# Patient Record
Sex: MALE | Race: OTHER | HISPANIC OR LATINO | URBAN - METROPOLITAN AREA
[De-identification: names, ages, dates, MRNs, and addresses within clinical notes are randomized per-mention and may not be internally consistent; named-entity substitution may affect disease eponyms.]

---

## 2020-03-03 ENCOUNTER — EMERGENCY (EMERGENCY)
Facility: HOSPITAL | Age: 35
LOS: 1 days | Discharge: HOME | End: 2020-03-03
Attending: EMERGENCY MEDICINE
Payer: MEDICAID

## 2020-03-03 VITALS
WEIGHT: 149.91 LBS | HEART RATE: 86 BPM | SYSTOLIC BLOOD PRESSURE: 133 MMHG | HEIGHT: 65 IN | OXYGEN SATURATION: 100 % | RESPIRATION RATE: 16 BRPM | TEMPERATURE: 98 F | DIASTOLIC BLOOD PRESSURE: 90 MMHG

## 2020-03-03 PROCEDURE — 99283 EMERGENCY DEPT VISIT LOW MDM: CPT

## 2020-03-03 PROCEDURE — T1013: CPT

## 2020-03-03 RX ORDER — IBUPROFEN 200 MG
1 TABLET ORAL
Qty: 16 | Refills: 0
Start: 2020-03-03 | End: 2020-03-06

## 2020-03-03 RX ORDER — IBUPROFEN 200 MG
600 TABLET ORAL ONCE
Refills: 0 | Status: COMPLETED | OUTPATIENT
Start: 2020-03-03 | End: 2020-03-03

## 2020-03-03 RX ADMIN — Medication 600 MILLIGRAM(S): at 15:45

## 2020-03-03 RX ADMIN — Medication 600 MILLIGRAM(S): at 15:44

## 2020-03-03 NOTE — ED PROVIDER NOTE - PATIENT PORTAL LINK FT
You can access the FollowMyHealth Patient Portal offered by [facility] by registering at the following website: [email or web address]. By joining [facility]’s FollowMyHealth portal, you will also be able to view your health information using other applications (apps) compatible with our system.

## 2020-03-03 NOTE — ED PROVIDER NOTE - ATTENDING CONTRIBUTION TO CARE
Hai: I performed a face to face bedside interview with patient regarding history of present illness, review of symptoms and past medical history. I completed an independent physical exam.  I have discussed patient's plan of care with advanced care provider.   I agree with note as stated above including HISTORY OF PRESENT ILLNESS, HIV, PAST MEDICAL/SURGICAL/FAMILY/SOCIAL HISTORY, ALLERGIES AND HOME MEDICATIONS, REVIEW OF SYSTEMS, PHYSICAL EXAM, MEDICAL DECISION MAKING and any PROGRESS NOTES during the time I functioned as the attending physician for this patient  unless otherwise noted. My brief assessment is as follows: 34M p/w R sided jaw pain. Felt a pop after yawning and then began having pain. Denies trauma. Jaw aligned properly on exam. Able to  tongue depressor against traction on exam. Plan for pain control and dc, return precautions given.

## 2020-03-03 NOTE — ED PROVIDER NOTE - CLINICAL SUMMARY MEDICAL DECISION MAKING FREE TEXT BOX
34 yr old M presented to ED with mandibular pain x 4 days. Pt denies any trauma or injury to his face. Pt says that he was yawning when he felt the pain . Pt denies any prior injury to his jaw. Pt examination + slight tenderness to TMJ area, Pt able to open his mouth with no issues. Pt treated with Motrin and D/C in stable condition.

## 2020-03-03 NOTE — ED PROVIDER NOTE - OBJECTIVE STATEMENT
34 yr old M presented to ED with mandibular pain x 4 days. Pt denies any trauma or injury to his face. Pt says that he was yawning when he felt the pain . Pt denies any prior injury to his jaw. Pt also denies pain radiating to his ear. Pt also denies any toothache. Pt says  that his tooth are non lining up. 34 yr old M presented to ED with mandibular pain x 4 days. Pt denies any trauma or injury to his face. Pt says that he was yawning when he felt the pain . Pt denies any prior injury to his jaw. Pt also denies pain radiating to his ear. Pt also denies any toothache. Pt says  that his tooth are not lining up. Pt denies any other issues at this time. 34 yr old M presented to ED with mandibular pain x 4 days. Pt denies any trauma or injury to his face. Pt says that he was yawning when he felt the pain . Pt denies any prior injury to his jaw. Pt also denies pain radiating to his ear. Pt also denies any toothache. Pt says  that his tooth are not lining up. Pt denies any other issues at this time. Pt denies any significant past medical or surgical illness.

## 2020-03-03 NOTE — ED ADULT TRIAGE NOTE - CHIEF COMPLAINT QUOTE
Pt with right sided jaw pain that occurs when he opens his mouth to eat. Denies any injury to that area. Denies any dental issues.

## 2022-11-30 ENCOUNTER — APPOINTMENT (OUTPATIENT)
Dept: CARDIOLOGY | Facility: CLINIC | Age: 37
End: 2022-11-30

## 2023-01-07 ENCOUNTER — EMERGENCY (EMERGENCY)
Facility: HOSPITAL | Age: 38
LOS: 1 days | End: 2023-01-07
Attending: EMERGENCY MEDICINE
Payer: MEDICAID

## 2023-01-07 VITALS
TEMPERATURE: 98 F | RESPIRATION RATE: 18 BRPM | HEART RATE: 91 BPM | DIASTOLIC BLOOD PRESSURE: 87 MMHG | OXYGEN SATURATION: 97 % | SYSTOLIC BLOOD PRESSURE: 160 MMHG | WEIGHT: 171.96 LBS

## 2023-01-07 VITALS
RESPIRATION RATE: 18 BRPM | TEMPERATURE: 98 F | HEART RATE: 88 BPM | OXYGEN SATURATION: 98 % | SYSTOLIC BLOOD PRESSURE: 130 MMHG

## 2023-01-07 PROCEDURE — 99284 EMERGENCY DEPT VISIT MOD MDM: CPT

## 2023-01-07 PROCEDURE — 0225U NFCT DS DNA&RNA 21 SARSCOV2: CPT

## 2023-01-07 PROCEDURE — 99283 EMERGENCY DEPT VISIT LOW MDM: CPT

## 2023-01-07 RX ORDER — GUAIFENESIN/DEXTROMETHORPHAN 600MG-30MG
10 TABLET, EXTENDED RELEASE 12 HR ORAL
Qty: 90 | Refills: 0
Start: 2023-01-07 | End: 2023-01-09

## 2023-01-07 NOTE — ED PROVIDER NOTE - PATIENT PORTAL LINK FT
You can access the FollowMyHealth Patient Portal offered by [facility] by registering at the following website: [email or web address]. By joining [facility]’s FollowMyHealth portal, you will also be able to view your health information using other applications (apps) compatible with our system.

## 2023-01-07 NOTE — ED PROVIDER NOTE - OBJECTIVE STATEMENT
38 y/o M with no reported PMHx p/w dry cough/runny nose x 1 week. Also reports of 1 episode of scant blood in his sputum after coughing few days ago which resolved. Denies fever/chills, sob, cp, rash, sore throat, ear pain, night sweats, weight loss, palpitations, leg swelling/calf tenderness, LOVE, orthopnea, abdominal pain, back pain and with no other c/o. Denies any exposure with sick contacts. Denies smoking/illicit drug use.

## 2023-01-07 NOTE — ED PROVIDER NOTE - NSFOLLOWUPINSTRUCTIONS_ED_ALL_ED_FT
Follow up with your PCP within 2-3 days  Return to the emergency room if you are experiencing any new or worsening symptoms    Cough    Coughing is a reflex that clears your throat and your airways. Coughing helps to heal and protect your lungs. It is normal to cough occasionally, but a cough that happens with other symptoms or lasts a long time may be a sign of a condition that needs treatment. Coughing may be caused by infections, asthma or COPD, smoking, postnasal drip, gastroesophageal reflux, as well as other medical conditions. Take medicines only as instructed by your health care provider. Avoid environments or triggers that causes you to cough at work or at home.    SEEK IMMEDIATE MEDICAL CARE IF YOU HAVE ANY OF THE FOLLOWING SYMPTOMS: coughing up blood, shortness of breath, rapid heart rate, chest pain, unexplained weight loss or night sweats.

## 2023-01-07 NOTE — ED ADULT TRIAGE NOTE - CHIEF COMPLAINT QUOTE
patient with 1 week with worsening cough, runny nose, occasionally sees blood streaks in mucous. no fevers.

## 2023-01-07 NOTE — ED ADULT TRIAGE NOTE - HISTORY OF COVID-19 VACCINATION
Called pt and reviewed the new jaun't with dr devlin, she vu and confirmed, df  
PT: SELF    PT CALLING TO SCHEDULE AN APPT FOR A F/U FROM BEING IN HOSPITAL AND GETTING 2 UNITS OF BLOOD.  PLEASE ADVISE    PT #: [phone]  
lvm about jaun't, asked pt to call and confirm, df  
Vaccine status unknown

## 2023-01-07 NOTE — ED ADULT NURSE NOTE - OBJECTIVE STATEMENT
Assumed care of patient in subwr-a. Pt a&ox4 rr even and unlabored with no pmhx presents to with nasal congestion and 1 episode of scant blood in sputum x1 week(resolved as per pt). Pt denies fever, chills, chest pain, abd pain, back pain, exposure to sick contacts. pt educated on plan of care, pt able to successfully teach back plan of care to RN, RN will continue to reeducate pt during hospital stay.

## 2023-01-07 NOTE — ED PROVIDER NOTE - PHYSICAL EXAMINATION
Constitutional: Awake, alert and oriented. In no acute distress. Well appearing.  HEENT: NC/AT. Moist mucous membranes.  Eyes: No scleral icterus. EOMI.  Neck:. Soft and supple. Full ROM without pain.  Cardiac: Regular rate and regular rhythm. +S1/S2. Peripheral pulses 2+ and symmetric. No LE edema.  Respiratory: Speaking in full sentences. No evidence of respiratory distress. No wheezes, rales or rhonchi.  Abdomen: Soft, non-distended and non tender Normal bowel sounds in all 4 quadrants. No guarding or rebound. no CVAT  Back: Spine midline and non-tender.   Skin: No rashes, abrasions or lacerations.  Lymph: No cervical lymphadenopathy.  Neuro: Awake, alert & oriented x 3. Moves all extremities symmetrically. Negative pronator drift, strength 5/5 all upper and lower extremities, sensation to light touch intact throughout upper/ lower extremities and face, finger to nose coordination intact, cranial nerves 2-12 intact  Psych: calm, cooperative, normal affect Constitutional: Awake, alert and oriented. In no acute distress. Well appearing.  HEENT: NC/AT. Moist mucous membranes.  Eyes: No scleral icterus. EOMI.  Neck:. Soft and supple. Full ROM without pain.  Cardiac: Regular rate and regular rhythm. +S1/S2. Peripheral pulses 2+ and symmetric. No LE edema.  Respiratory: Speaking in full sentences. No evidence of respiratory distress. No wheezes, rales or rhonchi.  Abdomen: Soft, non-distended and non tender Normal bowel sounds in all 4 quadrants. No guarding or rebound. no CVAT  Back: Spine midline and non-tender.   Skin: No rashes, abrasions or lacerations.  Lymph: No cervical lymphadenopathy.  Neuro: Awake, alert & oriented x 3. Moves all extremities symmetrically. Negative pronator drift, strength 5/5 all upper and lower extremities, sensation to light touch intact throughout upper/ lower extremities and face, finger to nose coordination intact, cranial nerves 2-12 intact  Psych: calm, cooperative, normal affect.

## 2023-01-07 NOTE — ED PROVIDER NOTE - NS ED ATTENDING STATEMENT MOD
This was a shared visit with the RODOLFO. I reviewed and verified the documentation and independently performed the documented:

## 2023-01-07 NOTE — ED PROVIDER NOTE - CLINICAL SUMMARY MEDICAL DECISION MAKING FREE TEXT BOX
38 y/o M with no reported PMHx p/w dry cough/runny nose x 1 week. Most likely viral illness. Vitals signs stable with good oxygen saturation on room air, pt looks well and lungs CTA bilaterally. Viral swab sent/pending. Instructed to f/u with PCP within 2-3 days. Strict ED return precautions given if any new or worsening symptoms.

## 2023-01-08 LAB
RAPID RVP RESULT: SIGNIFICANT CHANGE UP
SARS-COV-2 RNA SPEC QL NAA+PROBE: SIGNIFICANT CHANGE UP

## 2023-02-22 ENCOUNTER — EMERGENCY (EMERGENCY)
Facility: HOSPITAL | Age: 38
LOS: 1 days | Discharge: HOME | End: 2023-02-22
Attending: STUDENT IN AN ORGANIZED HEALTH CARE EDUCATION/TRAINING PROGRAM
Payer: MEDICAID

## 2023-02-22 VITALS
HEART RATE: 82 BPM | OXYGEN SATURATION: 95 % | WEIGHT: 315 LBS | RESPIRATION RATE: 16 BRPM | DIASTOLIC BLOOD PRESSURE: 88 MMHG | TEMPERATURE: 98 F | SYSTOLIC BLOOD PRESSURE: 138 MMHG | HEIGHT: 64 IN

## 2023-02-22 PROCEDURE — 99284 EMERGENCY DEPT VISIT MOD MDM: CPT

## 2023-02-22 RX ORDER — OFLOXACIN 0.3 %
1 DROPS OPHTHALMIC (EYE) ONCE
Refills: 0 | Status: COMPLETED | OUTPATIENT
Start: 2023-02-22 | End: 2023-02-22

## 2023-02-22 RX ORDER — OFLOXACIN 0.3 %
2 DROPS OPHTHALMIC (EYE)
Qty: 20 | Refills: 0
Start: 2023-02-22 | End: 2023-02-26

## 2023-02-22 RX ORDER — FLUORESCEIN SODIUM 9 MG
1 STRIP OPHTHALMIC (EYE) ONCE
Refills: 0 | Status: COMPLETED | OUTPATIENT
Start: 2023-02-22 | End: 2023-02-22

## 2023-02-22 RX ADMIN — Medication 1 DROP(S): at 23:47

## 2023-02-22 RX ADMIN — Medication 1 APPLICATION(S): at 23:47

## 2023-02-22 NOTE — ED PROVIDER NOTE - CLINICAL SUMMARY MEDICAL DECISION MAKING FREE TEXT BOX
36 y/o M with right eye pain x 3 days, small eyelash poking inwards from medial right upper eyelid which was removed, +corneal abrasion  will give abx eye drops and f/u ophtho   nam

## 2023-02-22 NOTE — ED PROVIDER NOTE - CARE PROVIDER_API CALL
Grace Wright)  Ophthalmology  [address], [address]  [place] [address]  Phone: [phone]  Fax: [phone]  Follow Up Time:

## 2023-02-22 NOTE — ED PROVIDER NOTE - PHYSICAL EXAMINATION
Gen: No acute distress, non toxic  HEENT: Mucous membranes moist, PERRL, EOMI, medial right eye injected, small eyelash poking inwards and hitting cornea from upper eyelid  woodslamp with fluorescein stain +corneal abrasion medial right eye   CV: RRR, nl s1/s2.  Resp: CTAB, normal rate and effort  GI: Abdomen soft, NT, ND. No rebound, no guarding  : No CVAT  Neuro: A&O x 3, moving all 4 extremities  MSK: No spine or joint tenderness to palpation  Skin: No rashes. intact and perfused.

## 2023-02-22 NOTE — ED PROVIDER NOTE - PATIENT PORTAL LINK FT
You can access the FollowMyHealth Patient Portal offered by [facility] by registering at the following website: [email or web address]. By joining [facility]’s FollowMyHealth portal, you will also be able to view your health information using other applications (apps) compatible with our system.

## 2023-02-22 NOTE — ED PROVIDER NOTE - ATTENDING APP SHARED VISIT CONTRIBUTION OF CARE
Pt with 3 d hx of R medial eye irritation/redness. no vision changes.    physical - R medial eye with conjunctival injection. no FB.  vision grossly intact.    plan will start eye drops and d/c with outpatient f/up and return instructions.

## 2023-02-22 NOTE — ED PROVIDER NOTE - NSFOLLOWUPINSTRUCTIONS_ED_ALL_ED_FT
Utilice gotas antibióticas para los ojos según las indicaciones.  Seguimiento con oftalmología en 2-3 días  regrese a la nagi de emergencias por cualquier síntoma nuevo o que empeore    [facility]  [address], [place] [address]  [phone]    abrasión corneal    La córnea es la cubierta transparente en la parte delantera y central del karma. Sarah tejido muy rebollar está formado por muchas capas. Si un rasguño o lesión hace que el epitelio corneal se desprenda, se denomina abrasión corneal. Los síntomas incluyen dolor en los ojos, enrojecimiento, lagrimeo, dificultad para mantener los ojos abiertos y sensibilidad a la lesly. No conduzca ni maneje maquinaria si tiene un parche en el karma. Se pueden prescribir gotas oftálmicas antibióticas para reducir el riesgo de infección. Es importante hacer un seguimiento con un oftalmólogo (médico de los ojos) para garantizar carol curación adecuada.    BUSQUE ATENCIÓN MÉDICA INMEDIATA SI TIENE ALGUNO DE LOS SIGUIENTES SÍNTOMAS: secreción de los ojos, cambios en la visión, fiebre o hinchazón.

## 2023-02-22 NOTE — ED PROVIDER NOTE - OBJECTIVE STATEMENT
36 y/o M no pmhx presents to ER c/o right eye pain and redness x 3 days. denies any trauma/injuries. denies blurred vision. denies contact lens use

## 2023-02-23 PROBLEM — Z78.9 OTHER SPECIFIED HEALTH STATUS: Chronic | Status: ACTIVE | Noted: 2023-01-07

## 2023-02-23 PROCEDURE — T1013: CPT

## 2023-02-23 PROCEDURE — 99283 EMERGENCY DEPT VISIT LOW MDM: CPT

## 2023-02-23 RX ADMIN — Medication 1 DROP(S): at 00:10

## 2023-02-26 PROBLEM — Z00.00 ENCOUNTER FOR PREVENTIVE HEALTH EXAMINATION: Status: ACTIVE | Noted: 2023-02-26
